# Patient Record
Sex: FEMALE | URBAN - METROPOLITAN AREA
[De-identification: names, ages, dates, MRNs, and addresses within clinical notes are randomized per-mention and may not be internally consistent; named-entity substitution may affect disease eponyms.]

---

## 2020-03-06 ENCOUNTER — HOSPITAL ENCOUNTER (EMERGENCY)
Age: 30
Discharge: HOME | End: 2020-03-06
Attending: OBSTETRICS & GYNECOLOGY | Admitting: OBSTETRICS & GYNECOLOGY
Payer: SELF-PAY

## 2020-03-06 VITALS
BODY MASS INDEX: 24.14 KG/M2 | WEIGHT: 163 LBS | TEMPERATURE: 97.6 F | HEART RATE: 69 BPM | OXYGEN SATURATION: 100 % | RESPIRATION RATE: 16 BRPM | HEIGHT: 69 IN | SYSTOLIC BLOOD PRESSURE: 112 MMHG | DIASTOLIC BLOOD PRESSURE: 68 MMHG

## 2020-03-06 PROCEDURE — 99283 EMERGENCY DEPT VISIT LOW MDM: CPT

## 2020-03-06 PROCEDURE — 75810000275 HC EMERGENCY DEPT VISIT NO LEVEL OF CARE

## 2020-03-06 NOTE — PROGRESS NOTES
1300:  Pt presents by Ambulance with c/o sharp stabbing pain in LLQ into groin after exercising on treadmill and picking son up from . Pain was debilitating, pt could not reposition without aggravating pain. This occurred at 1200 today     with C/S delivery for arrest of labor at 9 cm.    1302:  VSS.  by Doppler  At this time, pain is improved, intermittent and not as sharp. 1330:  Dr Juan Miguel Sinclair notified of pt arrival by [employer] 1410:  Purposeful round, Pt resting in bed with spouse at bedside. Pt verbalizes intermittent \"spasm\" occurrences where pain initiated. Pt has not been seen by Dr Juan Miguel Sinclair at this time. Pt declines offers for interventions. [id number]:  Dr Juan Miguel Sinclair to bedside, Speculum exam, palpation of tender groin performed. Discussion of pt exercise habits, and education to decrease incline on treadmill, and decrease activity level slightly, Tylenol and heat/moit heat for pain management, follow up with Dr Mike Maria on Tuesday of this coming week. Discharge order received, pt preparing for discharge at this time. 1505:  Discharge instructions reviewed, AVS signed, copies provided to patient. Pt ambulates off unit accompanied by spouse at this time.

## 2020-03-06 NOTE — ED NOTES
Pt was exercising on Treadmill and started having LLQ pain x 15 minutes. Pain has resolved. 22 weeks pregnant. Has not felt fetal movement since pain. Labor Charge RN aware of patient. Pt taken to labor unit via EMS stretcher.

## 2020-03-06 NOTE — H&P
Labor and Delivery Triage Note    CC: Left sided sharp pain    3/6/2020    30 y.o., , female, G2 P 1001 @ 21 5/7 wks with Estimated Date of Delivery: 2020 by dates and US presents at 54 with above CC. Patient reports having done her usual exercise (20 minutes on the treadmill 3.5 mph with incline of 10, followed by 20 minutes on the stair master). After completing her workout, she got in her car and reached back to give her child a snack and had acute onset of LLQ sharp pain with radiation down in her groin. The pain was so intense she couldn't move her leg. She called EMS and was brought to [facility]. The sharp pain resolved by the time of arrival here, but the groin continued to have some mild spasms. She has had no VB, no LOF, no contractions and no decreased FM. Patient was first seen by Dr. Tien Sanchez as the staff here didn't realize Dr. Milargo Torres (pt's primary OB was a [facility] provider). Pregnancy notable for previous  section. PNC: Blood type: A            RH: pos            Ab screen: neg            HBsAg: neg            DM Screen: too early            RPR/VDRL/TPA: neg            HIV: non reactive            GC/Chlamydia: neg            Rubella: immune            SVII serology: partner with pos HSV             GBS status: too early    No past medical history on file. No past surgical history on file. OB/GYN: G1 term  for arrest at 9 cm                  G2 current  Meds:   No current facility-administered medications for this encounter. Allergies: Allergies not on file  Pertinent ROS: as per HPI o/w neg   No family history on file.   Social History     Socioeconomic History    Marital status: Not on file     Spouse name: Not on file    Number of children: Not on file    Years of education: Not on file    Highest education level: Not on file   Occupational History    Not on file   Social Needs    Financial resource strain: Not on file    Food insecurity:     Worry: Not on file     Inability: Not on file    Transportation needs:     Medical: Not on file     Non-medical: Not on file   Tobacco Use    Smoking status: Not on file   Substance and Sexual Activity    Alcohol use: Not on file    Drug use: Not on file    Sexual activity: Not on file   Lifestyle    Physical activity:     Days per week: Not on file     Minutes per session: Not on file    Stress: Not on file   Relationships    Social connections:     Talks on phone: Not on file     Gets together: Not on file     Attends Restorationism service: Not on file     Active member of club or organization: Not on file     Attends meetings of clubs or organizations: Not on file     Relationship status: Not on file    Intimate partner violence:     Fear of current or ex partner: Not on file     Emotionally abused: Not on file     Physically abused: Not on file     Forced sexual activity: Not on file   Other Topics Concern    Not on file   Social History Narrative    Not on file       OBJECTIVE:  Gravid female NAD  Temp (24hrs), Av.6 °F (36.4 °C), Min:97.6 °F (36.4 °C), Max:97.6 °F (36.4 °C)    Visit Vitals  /59   Pulse 72   Temp 97.6 °F (36.4 °C)   Resp 16       Labs:  No results found for: WBC    Exam:  HEENT:  normal   Lungs:  Normal respiratory effort  Cor:  Well perfused  Abdomen:  Thin, soft, gravid; pt reports some soreness with palpation in the LLQ as well as in the inner thigh/L groin area  Fetal heart rate tracing:  FHTs 140s  Contraction pattern: none  Cervix:  Visually closed with no blood; thin white discharge in vault  Fluid:  intact  Ext: symmetric; L groin as above    Impression:  IUP at 21 5/7 weeks with LLQ sharp pain after exercise, now resolved.     Plan: Patient reassured           Will try decreasing her incline on the treadmill           Will try warm compress and/or warm epsom salt bath for comfort           Has appt on Luis Baker MD

## 2020-03-06 NOTE — DISCHARGE INSTRUCTIONS
Patient Education   Patient Education     Keep your appointment with Dr Barbi Ruvalcaba this Coming Tuesday. Call her office with concerns related to your pregnancy in the interim. Modify exercise routines as needed for your comfort. Consider [facility] for added abdominal support. Moist heat (warm tub soak water no more than 100 derrees Fahrenheit) and Tylenol (no more than 4000mg daily) as needed for pain/discomfort      Weeks 22 to 26 of Your Pregnancy: Care Instructions  Your Care Instructions    As you enter your 7th month of pregnancy at week 26, your baby's lungs are growing stronger and getting ready to breathe. You may notice that your baby responds to the sound of your or your partner's voice. You may also notice that your baby does less turning and twisting and more squirming or jerking. Jerking often means that your baby has the hiccups. Hiccups are perfectly normal and are only temporary. You may want to think about attending a childbirth preparation class. This is also a good time to start thinking about whether you want to have pain medicine during labor. Most pregnant women are tested for gestational diabetes between weeks 25 and 28. Gestational diabetes occurs when your blood sugar level gets too high when you're pregnant. The test is important, because you can have gestational diabetes and not know it. But the condition can cause problems for your baby. Follow-up care is a key part of your treatment and safety. Be sure to make and go to all appointments, and call your doctor if you are having problems. It's also a good idea to know your test results and keep a list of the medicines you take. How can you care for yourself at home? Ease discomfort from your baby's kicking  · Change your position. Sometimes this will cause your baby to change position too. · Take a deep breath while you raise your arm over your head. Then breathe out while you drop your arm.   Do Kegel exercises to prevent urine from leaking  · You can do Kegel exercises while you stand or sit. ? Squeeze the same muscles you would use to stop your urine. Your belly and thighs should not move. ? Hold the squeeze for 3 seconds, and then relax for 3 seconds. ? Start with 3 seconds. Then add 1 second each week until you are able to squeeze for 10 seconds. ? Repeat the exercise 10 to 15 times for each session. Do three or more sessions each day. Ease or reduce swelling in your feet, ankles, hands, and fingers  · If your fingers are puffy, take off your rings. · Do not eat high-salt foods, such as potato chips. · Prop up your feet on a stool or couch as much as possible. Sleep with pillows under your feet. · Do not stand for long periods of time or wear tight shoes. · Wear support stockings. Where can you learn more? Go to http://elton-geoff.info/. Enter G264 in the search box to learn more about \"Weeks 22 to 26 of Your Pregnancy: Care Instructions. \"  Current as of: May 29, 2019  Content Version: 12.2  © 9623-8372 [employer]. Care instructions adapted under license by [employer] (which disclaims liability or warranty for this information). If you have questions about a medical condition or this instruction, always ask your healthcare professional. [address] any warranty or liability for your use of this information. Round Ligament Pain: Care Instructions  Your Care Instructions    Round ligament pain is a common pain during pregnancy. You may feel a sharp brief pain on one or both sides of your belly. It may go down into your groin. It's usually felt for the first time during the second trimester. This pain is a normal part of pregnancy. It will go away as your pregnancy continues or after your baby is born. Your uterus is supported by two ligaments that go from the top and sides of the uterus to the bones of the pelvis.  These are the round ligaments. As your uterus grows, these ligaments stretch and tighten with your movements. This may be the cause of the pain. You may find that certain activities seem to cause pain. If you can, avoid those activities. Your doctor can usually diagnose round ligament pain from your symptoms and an exam. If you have bleeding or other symptoms, your doctor may also do an imaging test, such as an ultrasound. Your doctor may suggest that you take an over-the-counter pain medicine, such as acetaminophen. Follow-up care is a key part of your treatment and safety. Be sure to make and go to all appointments, and call your doctor if you are having problems. It's also a good idea to know your test results and keep a list of the medicines you take. How can you care for yourself at home? · If certain movements seem to trigger the pain, see if you can avoid them while you are pregnant. · Stay active. If your doctor says it's okay, try moderate exercise. Many pregnant women find that water exercise is most comfortable. Examples are swimming and water aerobics. · Ask your doctor about taking acetaminophen for pain. Be safe with medicines. Read and follow all instructions on the label. When should you call for help? Call your doctor now or seek immediate medical care if:    · You think you might be in labor.     · You have new or worse pain.    Watch closely for changes in your health, and be sure to contact your doctor if you have any problems. Where can you learn more? Go to http://elton-geoff.info/. Enter R110 in the search box to learn more about \"Round Ligament Pain: Care Instructions. \"  Current as of: May 29, 2019  Content Version: 12.2  © 9137-9672 [employer]. Care instructions adapted under license by [employer] (which disclaims liability or warranty for this information).  If you have questions about a medical condition or this instruction, always ask your healthcare professional. [address] any warranty or liability for your use of this information.